# Patient Record
Sex: FEMALE | Race: OTHER | Employment: FULL TIME | URBAN - METROPOLITAN AREA
[De-identification: names, ages, dates, MRNs, and addresses within clinical notes are randomized per-mention and may not be internally consistent; named-entity substitution may affect disease eponyms.]

---

## 2017-02-10 PROBLEM — J30.2 PERENNIAL ALLERGIC RHINITIS WITH SEASONAL VARIATION: Status: ACTIVE | Noted: 2017-02-10

## 2017-02-10 PROBLEM — L20.82 FLEXURAL ECZEMA: Status: ACTIVE | Noted: 2017-02-10

## 2017-02-10 PROBLEM — J30.89 PERENNIAL ALLERGIC RHINITIS WITH SEASONAL VARIATION: Status: ACTIVE | Noted: 2017-02-10

## 2017-06-12 DIAGNOSIS — Z30.41 ENCOUNTER FOR SURVEILLANCE OF CONTRACEPTIVE PILLS: Primary | ICD-10-CM

## 2017-06-12 RX ORDER — LEVONORGESTREL AND ETHINYL ESTRADIOL 0.1-0.02MG
1 KIT ORAL DAILY
Qty: 28 TABLET | Refills: 0 | Status: SHIPPED | OUTPATIENT
Start: 2017-06-12 | End: 2017-07-10

## 2017-07-10 DIAGNOSIS — Z30.41 ENCOUNTER FOR SURVEILLANCE OF CONTRACEPTIVE PILLS: ICD-10-CM

## 2017-07-10 RX ORDER — LEVONORGESTREL AND ETHINYL ESTRADIOL 0.1-0.02MG
1 KIT ORAL DAILY
Qty: 28 TABLET | Refills: 3 | Status: SHIPPED | OUTPATIENT
Start: 2017-07-10 | End: 2017-10-31

## 2017-07-21 PROBLEM — F41.0 ANXIETY ATTACK: Status: ACTIVE | Noted: 2017-07-21

## 2017-07-21 PROBLEM — F32.0 MILD SINGLE CURRENT EPISODE OF MAJOR DEPRESSIVE DISORDER (HCC): Status: ACTIVE | Noted: 2017-07-21

## 2017-07-21 NOTE — PATIENT INSTRUCTIONS
STACEY Taylor, Psychiatry. [address]  [place], [address]  Anxiety Reaction  Anxiety is the feeling we all get when we think something bad might happen.  It is a normal response to stress and usually causes only a mild reacti avoided. It is necessary to learn how to better manage stress. There are many proven methods that will reduce your anxiety.  These include simple things like exercise, good nutrition and adequate rest. Also, there are certain techniques that are helpful:  ¨ you have an anxiety disorder, the same response can occur at the wrong times.   Anxiety can be helpful  Normal anxiety is a signal from your brain that warns you of a threat and is a normal response to help you prevent something or decrease the bad effects program.     If anxiety is wearing you down, here are some things you can do to cope:  · Keep in mind that you can’t control everything about a situation. Change what you can and let the rest take its course.   · Exercise—it’s a great way to relieve tension flooded with anxiety hormones. This causes physical and emotional responses. Your heart pumps harder. Your muscles tense. You tremble or sweat. Emotionally, you feel intense worry, fear, or dread.  These sensations and emotions alert you to danger and help (sometimes called phobias) may relate to something that happened in combat. Or they may not. To escape the anxiety, you may try to avoid the situation that prompts it. Such avoidance can have a serious impact on your life.   · Feel a strong need to act on a Many types of counseling have been shown to be effective treatments for anxiety disorders. In counseling, you’ll likely learn new ways of responding to thoughts, feelings, and memories that make you anxious.  This can improve your symptoms and help change h have physical symptoms, such as a racing heartbeat or dizziness. If you have these feelings, you don’t have to suffer anymore. Treatment to help you overcome your fears will likely include therapy (also called counseling).  Medication may also be prescribed takes a few weeks to start working. If you don’t notice a change at first, you may just need more time. But if you don’t notice results after the first few weeks, tell your provider.   Keep taking medications as prescribed  Never change your dosage or stop [address], [facility] [employer]. All rights reserved. This information is not intended as a substitute for professional medical care. Always follow your healthcare professional's instructions.         Treating Anxiety Disorders with Therapy    If you have an a that may be making your anxiety worse. Getting better takes time  Therapy will help you feel better and teach you skills to help manage anxiety long term. But change doesn’t happen right away. It takes a commitment from you.  And treatment only works if yo this problem.   Panic attacks usually come on suddenly, reaches a peak within minutes, and includes at least 4 of these symptoms:  · Palpitations, pounding heart, or accelerated heart rate  · Sweating  · Crying  · Trembling or shaking  · Sensations of short stressful situations cannot be avoided. Therefore, it is necessary to learn how to manage stress better. There are many proven methods that work and will reduce your anxiety.  These include simple things like exercise, good nutrition and adequate rest. Also

## 2017-07-21 NOTE — PROGRESS NOTES
Maricel Godinez is a 25year old female. Patient presents with: Anxiety: Anxiety and depression      HPI:   Pt complains of symptoms of anxiety.    Has been having these sx's for feeling anxious almost every day, palpitations, feeling fullness in the stom edema  PSYCH: normal mood and affect, no hallucinations, no SI, no HI. Normal insight/judjment. ASSESSMENT AND PLAN:     Bess was seen today for anxiety.     Diagnoses and all orders for this visit:    Anxiety attack    Mild single current episode of

## 2017-07-24 ENCOUNTER — TELEPHONE (OUTPATIENT)
Dept: FAMILY MEDICINE CLINIC | Facility: CLINIC | Age: 22
End: 2017-07-24

## 2017-07-24 NOTE — TELEPHONE ENCOUNTER
Patient has a question regarding the Sertraline HCl (ZOLOFT) 25 MG Oral Tab knows she discussed depression but after speaking to people wants to know if this will also help with obsessive thoughts, please call patient to advise

## 2017-08-24 ENCOUNTER — NURSE TRIAGE (OUTPATIENT)
Dept: FAMILY MEDICINE CLINIC | Facility: CLINIC | Age: 22
End: 2017-08-24

## 2017-09-18 RX ORDER — SERTRALINE HYDROCHLORIDE 25 MG/1
25 TABLET, FILM COATED ORAL DAILY
Qty: 30 TABLET | Refills: 1 | Status: SHIPPED | OUTPATIENT
Start: 2017-09-18 | End: 2017-10-31

## 2017-09-18 NOTE — TELEPHONE ENCOUNTER
Patient requesting refill on Sertraline HCl (ZOLOFT) 25 MG Oral Tab be sent to the [facility] in [facility] pt is taking last dose today

## 2017-10-31 ENCOUNTER — TELEPHONE (OUTPATIENT)
Dept: FAMILY MEDICINE CLINIC | Facility: CLINIC | Age: 22
End: 2017-10-31

## 2017-10-31 DIAGNOSIS — Z30.41 ENCOUNTER FOR SURVEILLANCE OF CONTRACEPTIVE PILLS: ICD-10-CM

## 2017-10-31 RX ORDER — LEVONORGESTREL AND ETHINYL ESTRADIOL 0.1-0.02MG
1 KIT ORAL DAILY
Qty: 1 PACKAGE | Refills: 0 | Status: SHIPPED | OUTPATIENT
Start: 2017-10-31 | End: 2017-11-16

## 2017-10-31 RX ORDER — SERTRALINE HYDROCHLORIDE 25 MG/1
25 TABLET, FILM COATED ORAL DAILY
Qty: 30 TABLET | Refills: 1 | Status: SHIPPED | OUTPATIENT
Start: 2017-10-31 | End: 2017-11-16

## 2017-10-31 NOTE — TELEPHONE ENCOUNTER
Pt states she has no motivation and isn't sure if it's depression or not. Or a reaction to a medication. Pls call pt.

## 2017-10-31 NOTE — TELEPHONE ENCOUNTER
Pt needs Aviane (bc) and Sertraline HCl (ZOLOFT) 25 MG Oral Tab refilled Please call [phone] with questions.

## 2017-10-31 NOTE — TELEPHONE ENCOUNTER
Please call this patient and ask her if she has had a pap. If not, she is due before next OCP refill. Please have her schedule this.

## 2017-11-03 NOTE — TELEPHONE ENCOUNTER
Pt states she has had a lack of motivation, feeling sad, cried the other day in public, a little anxious, hard time getting out of bed in the am, sleeping a lot which makes her feel guilty. Denies any suicidal or homicidal thoughts.   Pt has a appt already

## 2017-11-16 ENCOUNTER — OFFICE VISIT (OUTPATIENT)
Dept: FAMILY MEDICINE CLINIC | Facility: CLINIC | Age: 22
End: 2017-11-16

## 2017-11-16 VITALS
RESPIRATION RATE: 18 BRPM | OXYGEN SATURATION: 100 % | WEIGHT: 191 LBS | HEART RATE: 72 BPM | TEMPERATURE: 98 F | BODY MASS INDEX: 29.98 KG/M2 | HEIGHT: 67 IN | SYSTOLIC BLOOD PRESSURE: 116 MMHG | DIASTOLIC BLOOD PRESSURE: 68 MMHG

## 2017-11-16 DIAGNOSIS — Z12.4 SCREENING FOR MALIGNANT NEOPLASM OF CERVIX: ICD-10-CM

## 2017-11-16 DIAGNOSIS — Z01.419 WELL WOMAN EXAM WITH ROUTINE GYNECOLOGICAL EXAM: Primary | ICD-10-CM

## 2017-11-16 DIAGNOSIS — Z23 NEED FOR TDAP VACCINATION: ICD-10-CM

## 2017-11-16 DIAGNOSIS — Z11.8 SCREENING FOR CHLAMYDIAL DISEASE: ICD-10-CM

## 2017-11-16 DIAGNOSIS — Z23 NEED FOR INFLUENZA VACCINATION: ICD-10-CM

## 2017-11-16 DIAGNOSIS — Z23 NEED FOR IMMUNIZATION AGAINST VIRAL HEPATITIS: ICD-10-CM

## 2017-11-16 DIAGNOSIS — Z30.41 ENCOUNTER FOR SURVEILLANCE OF CONTRACEPTIVE PILLS: ICD-10-CM

## 2017-11-16 DIAGNOSIS — Z23 NEED FOR HEPATITIS B VACCINATION: ICD-10-CM

## 2017-11-16 PROCEDURE — 90715 TDAP VACCINE 7 YRS/> IM: CPT | Performed by: FAMILY MEDICINE

## 2017-11-16 PROCEDURE — 90471 IMMUNIZATION ADMIN: CPT | Performed by: FAMILY MEDICINE

## 2017-11-16 PROCEDURE — 90632 HEPA VACCINE ADULT IM: CPT | Performed by: FAMILY MEDICINE

## 2017-11-16 PROCEDURE — 87591 N.GONORRHOEAE DNA AMP PROB: CPT | Performed by: FAMILY MEDICINE

## 2017-11-16 PROCEDURE — 90472 IMMUNIZATION ADMIN EACH ADD: CPT | Performed by: FAMILY MEDICINE

## 2017-11-16 PROCEDURE — 88175 CYTOPATH C/V AUTO FLUID REDO: CPT | Performed by: FAMILY MEDICINE

## 2017-11-16 PROCEDURE — 90746 HEPB VACCINE 3 DOSE ADULT IM: CPT | Performed by: FAMILY MEDICINE

## 2017-11-16 PROCEDURE — 87624 HPV HI-RISK TYP POOLED RSLT: CPT | Performed by: FAMILY MEDICINE

## 2017-11-16 PROCEDURE — 99395 PREV VISIT EST AGE 18-39: CPT | Performed by: FAMILY MEDICINE

## 2017-11-16 PROCEDURE — 87491 CHLMYD TRACH DNA AMP PROBE: CPT | Performed by: FAMILY MEDICINE

## 2017-11-16 RX ORDER — SERTRALINE HYDROCHLORIDE 25 MG/1
25 TABLET, FILM COATED ORAL DAILY
Qty: 90 TABLET | Refills: 1 | Status: SHIPPED | OUTPATIENT
Start: 2017-11-16 | End: 2018-07-20

## 2017-11-16 RX ORDER — LEVONORGESTREL AND ETHINYL ESTRADIOL 0.1-0.02MG
1 KIT ORAL DAILY
Qty: 1 PACKAGE | Refills: 11 | Status: SHIPPED | OUTPATIENT
Start: 2017-11-16 | End: 2018-11-16

## 2017-11-16 NOTE — PROGRESS NOTES
Keshia Rivers is a 25year old female who presents for a complete physical exam.   HPI:     Patient presents with:  Complete Form: [address] 12/30/17      Patient feels well, dental visit up to date, no hearing problem.   Vaccinations up to any unusual skin lesions or rashes  EYES: no visual complaints or deficits  HEENT: denies nasal congestion, sinus pain or sore throat; hearing loss negative,   RESPIRATORY: denies shortness of breath, wheezing or cough   CARDIOVASCULAR: denies chest pain, with no cyanosis, clubbing, or edema. MS: Normal muscles tones, no joints abnormalities. SKIN: Normal color, turgor, no lesions, rashes or wounds. PSYCH: normal affect and mood.           ASSESSMENT AND PLAN:     Franklyn Willis is a 25year old female who

## 2017-11-20 ENCOUNTER — TELEPHONE (OUTPATIENT)
Dept: FAMILY MEDICINE CLINIC | Facility: CLINIC | Age: 22
End: 2017-11-20

## 2017-11-20 NOTE — TELEPHONE ENCOUNTER
LVM for pt regarding results and instructions listed below. Pt instructed to call back with any further questions/concerns.

## 2018-02-28 DIAGNOSIS — F41.0 ANXIETY ATTACK: ICD-10-CM

## 2018-03-01 RX ORDER — CLONAZEPAM 0.5 MG/1
TABLET, ORALLY DISINTEGRATING ORAL
Qty: 40 TABLET | Refills: 3 | Status: SHIPPED
Start: 2018-03-01 | End: 2018-08-03

## 2018-07-20 ENCOUNTER — TELEPHONE (OUTPATIENT)
Dept: FAMILY MEDICINE CLINIC | Facility: CLINIC | Age: 23
End: 2018-07-20

## 2018-07-20 RX ORDER — SERTRALINE HYDROCHLORIDE 25 MG/1
25 TABLET, FILM COATED ORAL DAILY
Qty: 90 TABLET | Refills: 1 | Status: SHIPPED | OUTPATIENT
Start: 2018-07-20 | End: 2018-07-24 | Stop reason: CLARIF

## 2018-07-24 RX ORDER — SERTRALINE HYDROCHLORIDE 25 MG/1
25 TABLET, FILM COATED ORAL DAILY
Qty: 90 TABLET | Refills: 1 | Status: SHIPPED | OUTPATIENT
Start: 2018-07-24

## 2018-07-24 NOTE — TELEPHONE ENCOUNTER
Rx refilled and sent to [address], [facility] on 7/20/18. M for pt informing her that her Rx was sent. PSR: OK to lync me if/when pt calls back. Thank you.

## 2018-07-24 NOTE — TELEPHONE ENCOUNTER
Pt called back stating she has switched pharmacies. Called and cancelled Rx with Krystian Dinh. New Rx sent to new preferred pharmacy.

## 2018-07-24 NOTE — TELEPHONE ENCOUNTER
Patient called wanting to know the status of the refill request for Sertraline. Patient states that the pharmacy sent over a request also. Please call patient at [phone].

## 2018-08-03 ENCOUNTER — TELEPHONE (OUTPATIENT)
Dept: FAMILY MEDICINE CLINIC | Facility: CLINIC | Age: 23
End: 2018-08-03

## 2018-08-03 DIAGNOSIS — F41.0 ANXIETY ATTACK: ICD-10-CM

## 2018-08-03 RX ORDER — CLONAZEPAM 0.5 MG/1
TABLET, ORALLY DISINTEGRATING ORAL
Qty: 40 TABLET | Refills: 0 | Status: SHIPPED
Start: 2018-08-03

## 2018-08-03 NOTE — TELEPHONE ENCOUNTER
Chlonazepam needs to be transferred to the [facility] in [place] [address] [facility]. Patient tried to do this on the web but this medication can not be transferred it has to come from the providers office.

## 2018-09-07 ENCOUNTER — TELEPHONE (OUTPATIENT)
Dept: FAMILY MEDICINE CLINIC | Facility: CLINIC | Age: 23
End: 2018-09-07

## 2018-09-07 NOTE — TELEPHONE ENCOUNTER
Prescription on 8/3/18 for clonazepam was sent to the requested pharcheli. I spoke with Cornelio Licona at this location who says they never received this prescription.

## 2018-09-07 NOTE — TELEPHONE ENCOUNTER
Spoke to patient regarding 8/3/18 refill. She says she never received notification from [facility] that the prescription was ready so she never filed up on it. I told her I would call in the prescription for her and that she is due for follow up.  Patient says she

## 2018-09-07 NOTE — TELEPHONE ENCOUNTER
ClonazePAM 0.5 MG Oral Tablet Dispersible Sig :  Take One to two pills by mouth at night as needed for sleep or anxiety. Patient is needing this medication sent to [facility] in [facility] instead. She tried to do it on her own and she was not able to .

## 2019-01-09 ENCOUNTER — TELEPHONE (OUTPATIENT)
Dept: FAMILY MEDICINE CLINIC | Facility: CLINIC | Age: 24
End: 2019-01-09

## 2019-01-09 NOTE — TELEPHONE ENCOUNTER
Cheryle Record from the pharmacy called, Pt is needing a refill of Sertraline HCl (ZOLOFT) 25 MG Oral Tab, 90 day supply. The pharmacy indicated that they faxed 2 requests to us, on 1/2 and 1/8.

## 2019-01-09 NOTE — TELEPHONE ENCOUNTER
Per 9/7/18 telephone encounter, pt has moved to [address] [place] [address] will either need appt with Dr. Marbin León or find a new PCP for further refills. Refill request denied.

## 2024-01-05 ENCOUNTER — APPOINTMENT (RX ONLY)
Age: 29
Setting detail: DERMATOLOGY
End: 2024-01-05

## 2024-01-05 DIAGNOSIS — L20.89 OTHER ATOPIC DERMATITIS: ICD-10-CM

## 2024-01-05 PROCEDURE — ? COUNSELING

## 2024-01-05 PROCEDURE — 99203 OFFICE O/P NEW LOW 30 MIN: CPT

## 2024-01-05 PROCEDURE — ? PRESCRIPTION

## 2024-01-05 PROCEDURE — ? TREATMENT REGIMEN

## 2024-01-05 PROCEDURE — ? DIAGNOSIS COMMENT

## 2024-01-05 RX ORDER — TRIAMCINOLONE ACETONIDE 1 MG/G
CREAM TOPICAL
Qty: 453.6 | Refills: 1 | Status: ERX | COMMUNITY
Start: 2024-01-05

## 2024-01-05 RX ORDER — HYDROCORTISONE 25 MG/G
OINTMENT TOPICAL
Qty: 28.35 | Refills: 1 | Status: ERX | COMMUNITY
Start: 2024-01-05

## 2024-01-05 RX ADMIN — TRIAMCINOLONE ACETONIDE: 1 CREAM TOPICAL at 00:00

## 2024-01-05 RX ADMIN — HYDROCORTISONE: 25 OINTMENT TOPICAL at 00:00

## 2024-01-05 ASSESSMENT — LOCATION DETAILED DESCRIPTION DERM
LOCATION DETAILED: RIGHT ANTECUBITAL SKIN
LOCATION DETAILED: RIGHT MEDIAL BREAST 1-2:00 REGION
LOCATION DETAILED: LEFT SUPERIOR CENTRAL MALAR CHEEK
LOCATION DETAILED: MID POSTERIOR NECK
LOCATION DETAILED: LEFT ANTECUBITAL SKIN
LOCATION DETAILED: LEFT LATERAL BREAST 1-2:00 REGION
LOCATION DETAILED: RIGHT POPLITEAL SKIN
LOCATION DETAILED: LEFT PROXIMAL CALF
LOCATION DETAILED: RIGHT SUPERIOR CENTRAL MALAR CHEEK
LOCATION DETAILED: EPIGASTRIC SKIN

## 2024-01-05 ASSESSMENT — LOCATION SIMPLE DESCRIPTION DERM
LOCATION SIMPLE: ABDOMEN
LOCATION SIMPLE: LEFT CHEEK
LOCATION SIMPLE: LEFT BREAST
LOCATION SIMPLE: RIGHT BREAST
LOCATION SIMPLE: LEFT UPPER ARM
LOCATION SIMPLE: RIGHT POPLITEAL SKIN
LOCATION SIMPLE: LEFT CALF
LOCATION SIMPLE: POSTERIOR NECK
LOCATION SIMPLE: RIGHT UPPER ARM
LOCATION SIMPLE: RIGHT CHEEK

## 2024-01-05 ASSESSMENT — LOCATION ZONE DERM
LOCATION ZONE: FACE
LOCATION ZONE: ARM
LOCATION ZONE: TRUNK
LOCATION ZONE: NECK
LOCATION ZONE: LEG

## 2024-01-05 ASSESSMENT — SEVERITY ASSESSMENT 2020: SEVERITY 2020: SEVERE

## 2024-01-05 ASSESSMENT — BSA ECZEMA: % BODY COVERED IN ECZEMA: 20

## 2024-01-05 ASSESSMENT — ITCH NUMERIC RATING SCALE: HOW SEVERE IS YOUR ITCHING?: 8

## 2024-01-05 NOTE — HPI: ECZEMA (PATIENT REPORTED)
Where Is Your Eczema Located?: body throughout but worse on inner elbows
List Moisturizers You Are Currently Using (Separate Each Name With A Comma):: and oatmeal baths
Additional Comments (Use Complete Sentences): She has had eczema for years but typically localized to the flexoral areas. In college, she had mild eczema on the face. In the past year, her eczema has been much worse and spread throughout the body. She has been seeing her PCP for workup and labs were normal. Her PCP recently prescribed a small tube of triamcinolone. \\n\\nPatient has a h/o allergies to pen dander and trees. No asthma. [place] She is half [place].

## 2024-01-05 NOTE — PROCEDURE: DIAGNOSIS COMMENT
Comment: pt has extensive atopic dermatitis (eczema) on face, neck, arms, legs, breasts and trunk. Scalp is fairly clear. On face, eczema is worst around eyes. She has eczematous patches on breasts. We discussed the diagnosis and treatment options at some length, including topical and systemic treatment
Detail Level: Zone
Render Risk Assessment In Note?: no

## 2024-01-05 NOTE — PROCEDURE: TREATMENT REGIMEN
Detail Level: Zone
Initiate Treatment: --Start triamcinolone and hydrocortisone 2x/day for 2 weeks, then taper to 1x/day for 1 week, then qod for 1 week.\\n--Use the hydrocortisone in a similar way on face. Will likely transition to tacrolimus at next visit but this would likely cause too much burning for now\\n--discussed using emollients (creams or ointments) daily\\n--discussed bleach bathes (clinic handout on this was given)\\n--f/u in 3-4 weeks, sooner if needed

## 2024-02-01 ENCOUNTER — APPOINTMENT (RX ONLY)
Age: 29
Setting detail: DERMATOLOGY
End: 2024-02-01

## 2024-02-01 DIAGNOSIS — L81.0 POSTINFLAMMATORY HYPERPIGMENTATION: ICD-10-CM

## 2024-02-01 DIAGNOSIS — L20.89 OTHER ATOPIC DERMATITIS: ICD-10-CM

## 2024-02-01 DIAGNOSIS — L72.0 EPIDERMAL CYST: ICD-10-CM

## 2024-02-01 PROCEDURE — ? PRESCRIPTION

## 2024-02-01 PROCEDURE — 99213 OFFICE O/P EST LOW 20 MIN: CPT

## 2024-02-01 PROCEDURE — ? BENIGN DESTRUCTION COSMETIC MULTI

## 2024-02-01 PROCEDURE — ? RECOMMENDATIONS

## 2024-02-01 PROCEDURE — ? COUNSELING

## 2024-02-01 PROCEDURE — ? DIAGNOSIS COMMENT

## 2024-02-01 RX ORDER — TACROLIMUS 1 MG/G
OINTMENT TOPICAL
Qty: 30 | Refills: 3 | Status: ERX | COMMUNITY
Start: 2024-02-01

## 2024-02-01 RX ADMIN — TACROLIMUS: 1 OINTMENT TOPICAL at 00:00

## 2024-02-01 ASSESSMENT — LOCATION SIMPLE DESCRIPTION DERM
LOCATION SIMPLE: RIGHT PRETIBIAL REGION
LOCATION SIMPLE: RIGHT UPPER ARM
LOCATION SIMPLE: LEFT CHEEK
LOCATION SIMPLE: RIGHT CHEEK
LOCATION SIMPLE: LEFT FOREARM

## 2024-02-01 ASSESSMENT — LOCATION DETAILED DESCRIPTION DERM
LOCATION DETAILED: RIGHT ANTECUBITAL SKIN
LOCATION DETAILED: LEFT SUPERIOR LATERAL MALAR CHEEK
LOCATION DETAILED: LEFT CENTRAL MALAR CHEEK
LOCATION DETAILED: RIGHT SUPERIOR LATERAL MALAR CHEEK
LOCATION DETAILED: RIGHT CENTRAL MALAR CHEEK
LOCATION DETAILED: LEFT VENTRAL PROXIMAL FOREARM
LOCATION DETAILED: RIGHT LATERAL PROXIMAL PRETIBIAL REGION
LOCATION DETAILED: LEFT LATERAL MALAR CHEEK

## 2024-02-01 ASSESSMENT — LOCATION ZONE DERM
LOCATION ZONE: LEG
LOCATION ZONE: FACE
LOCATION ZONE: ARM

## 2024-02-01 NOTE — PROCEDURE: RECOMMENDATIONS
Detail Level: Zone
Recommendations (Free Text): --use the topical steroids as needed\\n--switch to tacrolimus on face and eyelids (from 2.5% HC)\\n--use a daily moisturizing cream such as Cetaphil or CeraVe cream\\n--f/u in 6-12 months, sooner if needed. Of note, [place]
Recommendation Preamble: The following was recommended
Render Risk Assessment In Note?: no

## 2024-02-01 NOTE — PROCEDURE: BENIGN DESTRUCTION COSMETIC MULTI
Price (Use Numbers Only, No Special Characters Or $): 30
Total Number Of Lesions Treated: 11
Post-Care Instructions: I reviewed with the patient in detail post-care instructions. Patient is to wear sunprotection, and avoid picking at any of the treated lesions. Pt may apply Vaseline to crusted or scabbing areas.
Detail Level: Simple
Consent: The patient's consent was obtained including but not limited to risks of crusting, scabbing, blistering, scarring, darker or lighter pigmentary change, recurrence, incomplete removal and infection.

## 2024-02-01 NOTE — PROCEDURE: DIAGNOSIS COMMENT
Render Risk Assessment In Note?: no
Comment: atopic derm is much on face, eyelids, trunk and extremities
Detail Level: Simple